# Patient Record
Sex: MALE | Race: WHITE | Employment: STUDENT | URBAN - METROPOLITAN AREA
[De-identification: names, ages, dates, MRNs, and addresses within clinical notes are randomized per-mention and may not be internally consistent; named-entity substitution may affect disease eponyms.]

---

## 2018-06-23 ENCOUNTER — TRANSFERRED RECORDS (OUTPATIENT)
Dept: HEALTH INFORMATION MANAGEMENT | Facility: CLINIC | Age: 23
End: 2018-06-23

## 2018-06-24 ENCOUNTER — TRANSFERRED RECORDS (OUTPATIENT)
Dept: HEALTH INFORMATION MANAGEMENT | Facility: CLINIC | Age: 23
End: 2018-06-24

## 2018-06-27 ENCOUNTER — TRANSFERRED RECORDS (OUTPATIENT)
Dept: HEALTH INFORMATION MANAGEMENT | Facility: CLINIC | Age: 23
End: 2018-06-27

## 2018-06-29 ENCOUNTER — TRANSFERRED RECORDS (OUTPATIENT)
Dept: HEALTH INFORMATION MANAGEMENT | Facility: CLINIC | Age: 23
End: 2018-06-29

## 2018-06-30 ENCOUNTER — TRANSFERRED RECORDS (OUTPATIENT)
Dept: HEALTH INFORMATION MANAGEMENT | Facility: CLINIC | Age: 23
End: 2018-06-30

## 2018-07-28 ENCOUNTER — TRANSFERRED RECORDS (OUTPATIENT)
Dept: HEALTH INFORMATION MANAGEMENT | Facility: CLINIC | Age: 23
End: 2018-07-28

## 2018-08-01 ENCOUNTER — TRANSFERRED RECORDS (OUTPATIENT)
Dept: HEALTH INFORMATION MANAGEMENT | Facility: CLINIC | Age: 23
End: 2018-08-01

## 2018-08-07 ENCOUNTER — MEDICAL CORRESPONDENCE (OUTPATIENT)
Dept: HEALTH INFORMATION MANAGEMENT | Facility: CLINIC | Age: 23
End: 2018-08-07

## 2018-08-07 ENCOUNTER — TRANSFERRED RECORDS (OUTPATIENT)
Dept: HEALTH INFORMATION MANAGEMENT | Facility: CLINIC | Age: 23
End: 2018-08-07

## 2018-08-08 ENCOUNTER — THERAPY VISIT (OUTPATIENT)
Dept: PHYSICAL THERAPY | Facility: CLINIC | Age: 23
End: 2018-08-08
Payer: COMMERCIAL

## 2018-08-08 DIAGNOSIS — Z98.1 S/P CERVICAL SPINAL FUSION: Primary | ICD-10-CM

## 2018-08-08 DIAGNOSIS — M62.81 MUSCLE WEAKNESS (GENERALIZED): ICD-10-CM

## 2018-08-08 DIAGNOSIS — M54.6 PAIN IN THORACIC SPINE: ICD-10-CM

## 2018-08-08 DIAGNOSIS — Z47.89 AFTERCARE FOLLOWING SURGERY OF THE MUSCULOSKELETAL SYSTEM: ICD-10-CM

## 2018-08-08 PROCEDURE — 97163 PT EVAL HIGH COMPLEX 45 MIN: CPT | Mod: GP | Performed by: PHYSICAL THERAPIST

## 2018-08-08 PROCEDURE — 97112 NEUROMUSCULAR REEDUCATION: CPT | Mod: GP | Performed by: PHYSICAL THERAPIST

## 2018-08-08 PROCEDURE — 97110 THERAPEUTIC EXERCISES: CPT | Mod: GP | Performed by: PHYSICAL THERAPIST

## 2018-08-08 NOTE — PROGRESS NOTES
[facility] Initial Evaluation  Subjective:  Patient is a 22 year old male presenting with rehab back hpi. The history is provided by the patient and a parent. No  was used.   Affected Side: Patient fractured his neck at C3,4 and C6,7 requiring fusion and discectomy. He also dislocated C7 and had nerve root compression of C7 with surgical intervention. he had plates and pins placed due to ulnar/radial fracture right wrist.  Occurance: Mountain biking in [place] and fell over the handle bars.  Condition occurred: during recreation/sport.  This is a new condition  DOI 6/23/18, DOS 6/24/18. Was in [facility] in [place] until yesterday he returned home to [place] and will be returning to college at [employer] in 2 weeks..    Patient reports pain:  Mid thoracic spine and upper thoracic spine.  Radiates to:  No radiation.  Pain is described as aching, sharp, stabbing and cramping and is constant (varying degrees. Greater when up ) and reported as 3/10.  Associated symptoms:  Tingling, numbness and loss of strength. Pain is worse during the day.  Symptoms are exacerbated by standing, lifting, walking and twisting and relieved by rest and analgesics.  Since onset symptoms are gradually improving.  Special tests:  MRI, x-ray and CT scan.  Previous treatment includes physical therapy and surgery.  There was significant improvement following previous treatment.  General health as reported by patient is good.  Pertinent medical history includes:  Numbness/tingling (weakness).  Medical allergies: no.  Surgical history: spinal fusion and wrist surgery.  Current medications:  Pain medication (tylenol).  Current occupation is student.  Patient is currently not working due to present treatment problem.  Primary job tasks include:  Prolonged sitting, prolonged standing, lifting, operating a machine and repetitive tasks (computer work, ).    Barriers include:  Transportation  and requires assistance with ADL's.    Red flags:  Changes in bowel/bladder.                        Objective:  Standing Alignment:    Cervical/Thoracic:  Forward head and flat thoracic upper spine  Shoulder/UE:  Rounded shoulders, elevated scapula L and elevated scapula R  Lumbar:  Lordosis decr  Pelvic:  PSIS high L and iliac crest high L  Hip:  Greater trochanter high L        Gait:    Assistive Devices:  None  Deviations:  Shoulder:  Decr arm swing R, decr arm swing L, shoulder hiking L and shoulder hiking RLumbar:  Trunk flexionGeneral Deviations:  Base of support decr, stride length decr and maryjane decr               Lumbar/SI Evaluation  ROM:  Arom wnl lumbar: Significant loss of movement thoracic spine.  AROM Lumbar:   Flexion:          Fingertip to knee  Ext:                    5 degrees   Side Bend:        Left:  Fingertip to mid thigh with minimal movement lumbar spine    Right:  Finger tip to mid thigh minimal movement lumbar spine  Rotation:           Left:     Right:   Side Glide:        Left:     Right:       AROM Thoracic:  Flex:              Ext:                 Rotation:        Left:  5 degrees    Right:  10 degrees     Strength: Extremely weak gluteal complex bilaterally. Patient unable to control pelvis in supine hook lying when lifting one foot off plinth, pelvis rotates and lumbar spine flexes and extends. In standing, stands with pelvis anterior to shoulders.  Lumbar Myotomes:          L5 (Great Toe Ext): Left: 3    Right: 3           Neural Tension/Mobility:      Left side:SLR or Slump  negative.     Right side:   Slump or SLR  negative.   Lumbar Palpation:    Tenderness present at Left:    Erector Spinae and Piriformis  Tenderness present at Right: Erector Spinae and Piriformis        SI joint/Sacrum:            Sacral conclusion left:  Posterior inominate and upslip       Cervical/Thoracic Evaluation  Arom wnl cervical: Patient in cervical collar for another 6 weeks. No movement of neck.  25# weight lifting limit with arms.           Cervical Myotomes:  Cervical myotomes: C1-5 not assessed bilaterally. T1 fair bilaterally. C8 good bilaterally.                  DTR's:  not assessed        Neural Tension:      Right side:  Ulnar and Median positive.    Cervical Palpation:    Tenderness present at Left:    Rhomboids and Upper Trap  Tenderness present at Right:    Rhomboids and Upper Trap        Cord Sign:  not assessed                                            General     ROS    Assessment/Plan:    Patient is a 22 year old male with cervical, thoracic, lumbar and pelvic complaints.    Patient has the following significant findings with corresponding treatment plan.                Diagnosis 1:  S/p cervical spine fusion  Pain -  hot/cold therapy, education and home program  Diagnosis 2:  Thoracic pain   Pain -  hot/cold therapy, manual therapy, self management, education and home program  Decreased ROM/flexibility - manual therapy, therapeutic exercise, therapeutic activity and home program  Decreased strength - therapeutic exercise, therapeutic activities and home program  Impaired posture - neuro re-education, therapeutic activities and home program  Diagnosis 3:  Generalized muscle weakness  Decreased strength - therapeutic exercise, therapeutic activities and home program  Impaired balance - neuro re-education, therapeutic activities and home program  Decreased proprioception - neuro re-education, therapeutic activities and home program  Impaired gait - gait training and home program  Decreased function - therapeutic activities and home program  Impaired posture - neuro re-education, therapeutic activities and home program     Therapy Evaluation Codes:   1) History comprised of:   Personal factors that impact the plan of care:      Living environment.    Comorbidity factors that impact the plan of care are:      Numbness/tingling and Weakness.     Medications impacting care: Pain.  2) Examination of  Body Systems comprised of:   Body structures and functions that impact the plan of care:      Cervical spine, Lumbar spine, Pelvis and Sacral illiac joint.   Activity limitations that impact the plan of care are:      Bathing, Bending, Driving, Dressing, Jumping, Lifting, Running, Sitting, Sports, Squatting/kneeling, Stairs, Standing, Throwing, Walking, Working and Sleeping.  3) Clinical presentation characteristics are:   Evolving/Changing.  4) Decision-Making    High complexity using standardized patient assessment instrument and/or measureable assessment of functional outcome.  Cumulative Therapy Evaluation is: High complexity.    Previous and current functional limitations:  (See Goal Flow Sheet for this information)    Short term and Long term goals: (See Goal Flow Sheet for this information)     Communication ability:  Patient appears to be able to clearly communicate and understand verbal and written communication and follow directions correctly.  Treatment Explanation - The following has been discussed with the patient:   RX ordered/plan of care  Anticipated outcomes  Possible risks and side effects  This patient would benefit from PT intervention to resume normal activities.   Rehab potential is good.    Frequency:  2 X week, once daily  Duration:  for 12 weeks  Discharge Plan:  Achieve all LTG.  Independent in home treatment program.  Reach maximal therapeutic benefit.    Please refer to the daily flowsheet for treatment today, total treatment time and time spent performing 1:1 timed codes.

## 2018-08-08 NOTE — LETTER
[facility]  [address]  [place] [address]  [phone]    2018    Re: Willi Mendosa   :   1995  MRN:  [id number]   REFERRING PHYSICIAN:   No ref. provider found    [facility] PHYSICAL [facility]    Date of Initial Evaluation: 2018  Visits:  Rxs Used: 1  Reason for Referral:     S/P cervical spinal fusion  Aftercare following surgery of the musculoskeletal system  Pain in thoracic spine  Muscle weakness (generalized)    EVALUATION SUMMARY    [facility] Initial Evaluation  Subjective:  Patient is a 22 year old male presenting with rehab back hpi. The history is provided by the patient and a parent. No  was used.   Affected Side: Patient fractured his neck at C3,4 and C6,7 requiring fusion and discectomy. He also dislocated C7 and had nerve root compression of C7 with surgical intervention. he had plates and pins placed due to ulnar/radial fracture right wrist.  Occurance: Mountain biking in [place] and fell over the handle bars.  Condition occurred: during recreation/sport.  This is a new condition  DOI 18, DOS 18. Was in [facility] in [place] until yesterday he returned home to [place] and will be returning to college at [employer] in 2 weeks..    Patient reports pain:  Mid thoracic spine and upper thoracic spine.  Radiates to:  No radiation.  Pain is described as aching, sharp, stabbing and cramping and is constant (varying degrees. Greater when up ) and reported as 3/10.  Associated symptoms:  Tingling, numbness and loss of strength. Pain is worse during the day.  Symptoms are exacerbated by standing, lifting, walking and twisting and relieved by rest and analgesics.  Since onset symptoms are gradually improving.  Special tests:  MRI, x-ray and CT scan.  Previous treatment includes physical therapy and surgery.  There was  significant improvement following previous treatment.  General health as reported by patient is good.  Pertinent medical history includes:  Numbness/tingling (weakness).  Medical allergies: no.  Surgical history: spinal fusion and wrist surgery.  Current medications:  Pain medication (tylenol).  Current occupation is student.  Patient is currently not working due to present treatment problem.  Primary job tasks include:  Prolonged sitting, prolonged standing, lifting, operating a machine and repetitive tasks (computer work, ).  Barriers include:  Transportation and requires assistance with ADL's.  Red flags:  Changes in bowel/bladder.    Objective:  Standing Alignment:    Cervical/Thoracic:  Forward head and flat thoracic upper spine  Shoulder/UE:  Rounded shoulders, elevated scapula L and elevated scapula R  Lumbar:  Lordosis decr  Pelvic:  PSIS high L and iliac crest high L  Hip:  Greater trochanter high L    Gait:    Assistive Devices:  None  Deviations:  Shoulder:  Decr arm swing R, decr arm swing L, shoulder hiking L and shoulder hiking RLumbar:  Trunk flexionGeneral Deviations:  Base of support decr, stride length decr and maryjane decr          Lumbar/SI Evaluation  ROM:  Arom wnl lumbar: Significant loss of movement thoracic spine.  AROM Lumbar:   Flexion:          Fingertip to knee  Ext:                    5 degrees   Side Bend:        Left:  Fingertip to mid thigh with minimal movement lumbar spine    Right:  Finger tip to mid thigh minimal movement lumbar spine  Rotation:           Left:     Right:   Side Glide:        Left:     Right:       AROM Thoracic:  Flex:              Ext:                 Rotation:        Left:  5 degrees    Right:  10 degrees   Strength: Extremely weak gluteal complex bilaterally. Patient unable to control pelvis in supine hook lying when lifting one foot off plinth, pelvis rotates and lumbar spine flexes and extends. In standing, stands with pelvis anterior to  shoulders.    Lumbar Myotomes:    L5 (Great Toe Ext): Left: 3    Right: 3     Neural Tension/Mobility:    Left side:SLR or Slump  negative.   Right side:   Slump or SLR  negative.     Lumbar Palpation:    Tenderness present at Left:    Erector Spinae and Piriformis  Tenderness present at Right: Erector Spinae and Piriformis    SI joint/Sacrum:      Sacral conclusion left:  Posterior inominate and upslip       Cervical/Thoracic Evaluation  Arom wnl cervical: Patient in cervical collar for another 6 weeks. No movement of neck. 25# weight lifting limit with arms.       Cervical Myotomes:  Cervical myotomes: C1-5 not assessed bilaterally. T1 fair bilaterally. C8 good bilaterally.    DTR's:  not assessed    Neural Tension:    Right side:  Ulnar and Median positive.    Cervical Palpation:    Tenderness present at Left:    Rhomboids and Upper Trap  Tenderness present at Right:    Rhomboids and Upper Trap    Cord Sign:  not assessed    Assessment/Plan:    Patient is a 22 year old male with cervical, thoracic, lumbar and pelvic complaints.    Patient has the following significant findings with corresponding treatment plan.                Diagnosis 1:  S/p cervical spine fusion  Pain -  hot/cold therapy, education and home program  Diagnosis 2:  Thoracic pain   Pain -  hot/cold therapy, manual therapy, self management, education and home program  Decreased ROM/flexibility - manual therapy, therapeutic exercise, therapeutic activity and home program  Decreased strength - therapeutic exercise, therapeutic activities and home program  Impaired posture - neuro re-education, therapeutic activities and home program  Diagnosis 3:  Generalized muscle weakness  Decreased strength - therapeutic exercise, therapeutic activities and home program  Impaired balance - neuro re-education, therapeutic activities and home program  Decreased proprioception - neuro re-education, therapeutic activities and home program  Impaired gait - gait  training and home program  Decreased function - therapeutic activities and home program  Impaired posture - neuro re-education, therapeutic activities and home program     Therapy Evaluation Codes:   1) History comprised of:   Personal factors that impact the plan of care:      Living environment.    Comorbidity factors that impact the plan of care are:      Numbness/tingling and Weakness.     Medications impacting care: Pain.  2) Examination of Body Systems comprised of:   Body structures and functions that impact the plan of care:      Cervical spine, Lumbar spine, Pelvis and Sacral illiac joint.   Activity limitations that impact the plan of care are:      Bathing, Bending, Driving, Dressing, Jumping, Lifting, Running, Sitting, Sports, Squatting/kneeling, Stairs, Standing, Throwing, Walking, Working and Sleeping.  3) Clinical presentation characteristics are:   Evolving/Changing.  4) Decision-Making    High complexity using standardized patient assessment instrument and/or measureable assessment of functional outcome.  Cumulative Therapy Evaluation is: High complexity.    Previous and current functional limitations:  (See Goal Flow Sheet for this information)    Short term and Long term goals: (See Goal Flow Sheet for this information)     Communication ability:  Patient appears to be able to clearly communicate and understand verbal and written communication and follow directions correctly.  Treatment Explanation - The following has been discussed with the patient:   RX ordered/plan of care  Anticipated outcomes  Possible risks and side effects  This patient would benefit from PT intervention to resume normal activities.   Rehab potential is good.    Frequency:  2 X week, once daily  Duration:  for 12 weeks  Discharge Plan:  Achieve all LTG.  Independent in home treatment program.  Reach maximal therapeutic benefit.    Please refer to the daily flowsheet for treatment today, total treatment time and time spent  performing 1:1 timed codes.     Thank you for your referral.    INQUIRIES  Therapist: Yasmin Rodriguez PT, [facility]  [facility] [place] [facility]  [address] [place] [address] [id number]  [facility] [address]  Phone: [phone]  Fax: [phone]

## 2018-08-13 ENCOUNTER — THERAPY VISIT (OUTPATIENT)
Dept: PHYSICAL THERAPY | Facility: CLINIC | Age: 23
End: 2018-08-13
Payer: COMMERCIAL

## 2018-08-13 DIAGNOSIS — Z98.1 S/P CERVICAL SPINAL FUSION: ICD-10-CM

## 2018-08-13 DIAGNOSIS — M62.81 MUSCLE WEAKNESS (GENERALIZED): ICD-10-CM

## 2018-08-13 DIAGNOSIS — Z47.89 AFTERCARE FOLLOWING SURGERY OF THE MUSCULOSKELETAL SYSTEM: ICD-10-CM

## 2018-08-13 DIAGNOSIS — M54.6 PAIN IN THORACIC SPINE: ICD-10-CM

## 2018-08-13 PROCEDURE — 97110 THERAPEUTIC EXERCISES: CPT | Mod: GP | Performed by: PHYSICAL THERAPIST

## 2018-08-13 PROCEDURE — 97112 NEUROMUSCULAR REEDUCATION: CPT | Mod: GP | Performed by: PHYSICAL THERAPIST

## 2018-08-15 ENCOUNTER — THERAPY VISIT (OUTPATIENT)
Dept: PHYSICAL THERAPY | Facility: CLINIC | Age: 23
End: 2018-08-15
Payer: COMMERCIAL

## 2018-08-15 DIAGNOSIS — M62.81 MUSCLE WEAKNESS (GENERALIZED): ICD-10-CM

## 2018-08-15 DIAGNOSIS — Z98.1 S/P CERVICAL SPINAL FUSION: ICD-10-CM

## 2018-08-15 DIAGNOSIS — M54.6 PAIN IN THORACIC SPINE: ICD-10-CM

## 2018-08-15 DIAGNOSIS — Z47.89 AFTERCARE FOLLOWING SURGERY OF THE MUSCULOSKELETAL SYSTEM: ICD-10-CM

## 2018-08-15 PROCEDURE — 97110 THERAPEUTIC EXERCISES: CPT | Mod: GP | Performed by: PHYSICAL THERAPIST

## 2018-08-15 PROCEDURE — 97140 MANUAL THERAPY 1/> REGIONS: CPT | Mod: GP | Performed by: PHYSICAL THERAPIST

## 2018-08-17 ENCOUNTER — THERAPY VISIT (OUTPATIENT)
Dept: PHYSICAL THERAPY | Facility: CLINIC | Age: 23
End: 2018-08-17
Payer: COMMERCIAL

## 2018-08-17 DIAGNOSIS — Z98.1 S/P CERVICAL SPINAL FUSION: ICD-10-CM

## 2018-08-17 DIAGNOSIS — M62.81 MUSCLE WEAKNESS (GENERALIZED): ICD-10-CM

## 2018-08-17 DIAGNOSIS — M54.6 PAIN IN THORACIC SPINE: ICD-10-CM

## 2018-08-17 DIAGNOSIS — Z47.89 AFTERCARE FOLLOWING SURGERY OF THE MUSCULOSKELETAL SYSTEM: ICD-10-CM

## 2018-08-17 PROCEDURE — 97110 THERAPEUTIC EXERCISES: CPT | Mod: GP | Performed by: PHYSICAL THERAPIST

## 2018-08-17 PROCEDURE — 97112 NEUROMUSCULAR REEDUCATION: CPT | Mod: GP | Performed by: PHYSICAL THERAPIST

## 2018-08-17 NOTE — PROGRESS NOTES
Subjective:  HPI                    Objective:  System    Physical Exam    General     ROS    Assessment/Plan:    DISCHARGE REPORT    Progress reporting period is from 8/8/18 to 8/17/18 (4 visits).       SUBJECTIVE  Subjective changes noted by patient: .  Patient notes he is doing well with his HEP.  He has had a few instances of pain in the L arm and tricep area, but relates to prolonged sitting with slouched posture (discussed if feels that to correct posture first and bring left arm toward chest).  Notes he will move to college this weekend at [place].  He has PT set up there.     Current pain level is 2/10  .     nitial Pain level: 3/10.   Changes in function:  Yes (See Goal flowsheet attached for changes in current functional level)  Adverse reaction to treatment or activity: None    OBJECTIVE  Changes noted in objective findings:  Yes,   Objective: 4/5 bicep/tricep strength, 4/5 hip abduction and extension strength.  SLS level eyes open x 30 sec on L and 20 on R.  Gait steadiness is improving, intermittent mild sway, but no LOB with ambulation.  Overall improving.  Has HEP in place and undestands plan.      Ambulating: Alter G x 40 min 90% BW no incline small shorts brisk walking speed in PT.  Likely ready to work on a standard treadmill or walking on level ground going forward as balance and gait have improved.     ASSESSMENT/PLAN  Updated problem list and treatment plan: Diagnosis 1:  UE and LE weakness following spinal cord injury and cervical fusion on 6/24/18    Pain -  hot/cold therapy  Decreased ROM/flexibility - manual therapy and therapeutic exercise  Decreased strength - therapeutic exercise and therapeutic activities  Decreased proprioception - neuro re-education and therapeutic activities  Impaired gait - gait training  Decreased function - therapeutic activities  Impaired posture - neuro re-education  STG/LTGs have been met or progress has been made towards goals:  Yes (See Goal flow sheet  completed today.)  Assessment of Progress: The patient's condition is improving.  Self Management Plans:  Patient has been instructed in a home treatment program.  I have re-evaluated this patient and find that the nature, scope, duration and intensity of the therapy is appropriate for the medical condition of the patient.  Willi continues to require the following intervention to meet STG and LTG's:  PT    Recommendations:  Patient would benefit from continued PT to address deficits.  He is; however, moving this weekend for college at [place].  He will resume PT there after he arrives.  Will discharge from PT at this facility, and he will continue as planned near [place].    Please refer to the daily flowsheet for treatment today, total treatment time and time spent performing 1:1 timed codes.

## 2018-08-17 NOTE — LETTER
[place] [facility]AP[facility]  [address]  [place] [address]  [phone]    2018    Re: Willi Mendosa   :   1995  MRN:  [id number]   REFERRING PHYSICIAN:   Provider Not In System    [facility] ATHLETIC [facility]  Date of Initial Evaluation: 2018  Visits:  Rxs Used: 4  Reason for Referral:     S/P cervical spinal fusion  Aftercare following surgery of the musculoskeletal system  Pain in thoracic spine  Muscle weakness (generalized)    EVALUATION SUMMARY  Assessment/Plan:    DISCHARGE REPORT    Progress reporting period is from 18 to 18 (4 visits).       SUBJECTIVE  Subjective changes noted by patient: .  Patient notes he is doing well with his HEP.  He has had a few instances of pain in the L arm and tricep area, but relates to prolonged sitting with slouched posture (discussed if feels that to correct posture first and bring left arm toward chest).  Notes he will move to college this weekend at [place].  He has PT set up there.     Current pain level is 2/10  .     nitial Pain level: 3/10.   Changes in function:  Yes (See Goal flowsheet attached for changes in current functional level)  Adverse reaction to treatment or activity: None    OBJECTIVE  Changes noted in objective findings:  Yes,   Objective: 4/5 bicep/tricep strength, 4/5 hip abduction and extension strength.  SLS level eyes open x 30 sec on L and 20 on R.  Gait steadiness is improving, intermittent mild sway, but no LOB with ambulation.  Overall improving.  Has HEP in place and undestands plan.    Ambulating: Alter G x 40 min 90% BW no incline small shorts brisk walking speed in PT.  Likely ready to work on a standard treadmill or walking on level ground going forward as balance and gait have improved.     ASSESSMENT/PLAN  Updated problem list and treatment plan: Diagnosis 1:  UE and LE weakness following spinal cord injury and cervical  fusion on 6/24/18    Pain -  hot/cold therapy  Decreased ROM/flexibility - manual therapy and therapeutic exercise  Decreased strength - therapeutic exercise and therapeutic activities  Decreased proprioception - neuro re-education and therapeutic activities  Impaired gait - gait training  Decreased function - therapeutic activities  Impaired posture - neuro re-education  STG/LTGs have been met or progress has been made towards goals:  Yes (See Goal flow sheet completed today.)  Assessment of Progress: The patient's condition is improving.  Self Management Plans:  Patient has been instructed in a home treatment program.  I have re-evaluated this patient and find that the nature, scope, duration and intensity of the therapy is appropriate for the medical condition of the patient.  Willi continues to require the following intervention to meet STG and LTG's:  PT    Recommendations:  Patient would benefit from continued PT to address deficits.  He is; however, moving this weekend for college at [place].  He will resume PT there after he arrives.  Will discharge from PT at this facility, and he will continue as planned near [place].    Please refer to the daily flowsheet for treatment today, total treatment time and time spent performing 1:1 timed codes.    Thank you for your referral.    INQUIRIES  Therapist: Jose Guevara, RHYSPT, Cert. SCS, MDT  [facility] [place] [facility]  [address] [place] [address] [address] [place] [address]  Phone: [phone]  Fax: [phone]

## 2018-09-07 ENCOUNTER — MEDICAL CORRESPONDENCE (OUTPATIENT)
Dept: HEALTH INFORMATION MANAGEMENT | Facility: CLINIC | Age: 23
End: 2018-09-07

## 2018-09-25 ENCOUNTER — TRANSFERRED RECORDS (OUTPATIENT)
Dept: HEALTH INFORMATION MANAGEMENT | Facility: CLINIC | Age: 23
End: 2018-09-25

## 2018-10-03 ENCOUNTER — TELEPHONE (OUTPATIENT)
Dept: NEUROLOGY | Facility: CLINIC | Age: 23
End: 2018-10-03

## 2018-10-04 ENCOUNTER — TRANSFERRED RECORDS (OUTPATIENT)
Dept: HEALTH INFORMATION MANAGEMENT | Facility: CLINIC | Age: 23
End: 2018-10-04

## 2018-10-11 ENCOUNTER — TRANSFERRED RECORDS (OUTPATIENT)
Dept: HEALTH INFORMATION MANAGEMENT | Facility: CLINIC | Age: 23
End: 2018-10-11

## 2018-10-13 ENCOUNTER — HEALTH MAINTENANCE LETTER (OUTPATIENT)
Age: 23
End: 2018-10-13

## 2018-10-17 ASSESSMENT — ENCOUNTER SYMPTOMS
ALTERED TEMPERATURE REGULATION: 0
STIFFNESS: 1
HALLUCINATIONS: 0
SINUS PAIN: 0
SMELL DISTURBANCE: 0
NECK MASS: 0
NAIL CHANGES: 0
SPEECH CHANGE: 0
LOSS OF CONSCIOUSNESS: 0
WEAKNESS: 1
NIGHT SWEATS: 0
DISTURBANCES IN COORDINATION: 0
HEADACHES: 0
MUSCLE WEAKNESS: 1
MEMORY LOSS: 0
TROUBLE SWALLOWING: 0
CHILLS: 0
DECREASED APPETITE: 0
TASTE DISTURBANCE: 0
POLYDIPSIA: 0
POLYPHAGIA: 0
PARALYSIS: 0
DIZZINESS: 1
POOR WOUND HEALING: 1
HOARSE VOICE: 0
NUMBNESS: 1
SINUS CONGESTION: 1
SORE THROAT: 1
WEIGHT LOSS: 0
MYALGIAS: 1
TINGLING: 1
TREMORS: 0
ARTHRALGIAS: 1
FATIGUE: 0
NECK PAIN: 1
INCREASED ENERGY: 0
BACK PAIN: 1
SKIN CHANGES: 0
FEVER: 0
SEIZURES: 0
WEIGHT GAIN: 0
JOINT SWELLING: 0
MUSCLE CRAMPS: 0

## 2018-10-18 ENCOUNTER — RADIANT APPOINTMENT (OUTPATIENT)
Dept: GENERAL RADIOLOGY | Facility: CLINIC | Age: 23
End: 2018-10-18
Attending: PHYSICIAN ASSISTANT
Payer: COMMERCIAL

## 2018-10-18 ENCOUNTER — OFFICE VISIT (OUTPATIENT)
Dept: NEUROSURGERY | Facility: CLINIC | Age: 23
End: 2018-10-18
Payer: COMMERCIAL

## 2018-10-18 VITALS
HEIGHT: 77 IN | RESPIRATION RATE: 18 BRPM | DIASTOLIC BLOOD PRESSURE: 77 MMHG | BODY MASS INDEX: 19.42 KG/M2 | TEMPERATURE: 98.2 F | HEART RATE: 77 BPM | WEIGHT: 164.5 LBS | SYSTOLIC BLOOD PRESSURE: 121 MMHG | OXYGEN SATURATION: 100 %

## 2018-10-18 DIAGNOSIS — T81.89XA NON-HEALING SURGICAL WOUND, INITIAL ENCOUNTER: ICD-10-CM

## 2018-10-18 DIAGNOSIS — Z87.81 H/O CERVICAL FRACTURE: Primary | ICD-10-CM

## 2018-10-18 ASSESSMENT — PAIN SCALES - GENERAL: PAINLEVEL: MILD PAIN (2)

## 2018-10-18 NOTE — MR AVS SNAPSHOT
After Visit Summary   10/18/2018    Willi Mendosa    MRN: [id number]           Patient Information     Date Of Birth          1995        Visit Information        Provider Department      10/18/2018 11:00 AM Shelley Burnett PA-C [facility]surgery        Today's Diagnoses     H/O cervical fracture    -  1       Follow-ups after your visit        Your next 10 appointments already scheduled     Oct 18, 2018 12:05 PM CDT   XR CERVICAL SPINE G/E 4 VIEWS with UCXR1   [facility] ([facility])    [address] [address] [phone]           How do I prepare for my exam? (Food and drink instructions) No Food and Drink Restrictions.  How do I prepare for my exam? (Other instructions) You do not need to do anything special for this exam.  What should I wear: Wear comfortable clothes.  How long does the exam take: Most scans take less than 5 minutes.  What should I bring: Bring a list of your medicines, including vitamins, minerals and over-the-counter drugs. It is safest to leave personal items at home.  Do I need a :  No  is needed.  What do I need to tell my doctor: Tell your doctor if there s any chance you are pregnant.  What should I do after the exam: No restrictions, You may resume normal activities.  What is this test: An image of a specific body part shown in shades of black and white.  Who should I call with questions: If you have any questions, please call the Imaging Department where you will have your exam. Directions, parking instructions, and other information is available on our website, [email or web address].            Dec 17, 2018 10:00 AM CST   (Arrive by 9:45 AM)   Return Visit with CHRISTOFER France [facility] ([facility])    [address] [address] [phone]              Who to contact     Please call  "your clinic at [phone] to:    Ask questions about your health    Make or cancel appointments    Discuss your medicines    Learn about your test results    Speak to your doctor            Additional Information About Your Visit        [employer] Information     [employer] gives you secure access to your electronic health record. If you see a primary care provider, you can also send messages to your care team and make appointments. If you have questions, please call your primary care clinic.  If you do not have a primary care provider, please call [phone] and they will assist you.      [employer] is an electronic gateway that provides easy, online access to your medical records. With [employer], you can request a clinic appointment, read your test results, renew a prescription or communicate with your care team.     To access your existing account, please contact your [facility] or call [phone] for assistance.        Care EveryWhere ID     This is your Care EveryWhere ID. This could be used by other organizations to access your [facility] medical records  [id number]        Your Vitals Were     Pulse Temperature Respirations Height Pulse Oximetry BMI (Body Mass Index)    77 98.2  F (36.8  C) (Oral) 18 1.956 m (6' 5\") 100% 19.51 kg/m2       Blood Pressure from Last 3 Encounters:   10/18/18 121/77    Weight from Last 3 Encounters:   10/18/18 74.6 kg (164 lb 8 oz)              We Performed the Following     X-ray Cervical spine 4 views (AP, lateral, flexion, extension)  [[id number]]        Primary Care Provider Fax #    Physician No Ref-Primary [phone]       No address on file        Equal Access to Services     ELEAZAR ROBLES : Hadii shine wileyo Sokingsley, waaxda luqadaha, qaybta kaalmada adeegyada, sharan keys . So [facility] [phone].    ATENCIÓN: Si habla español, tiene a alfred disposición servicios gratuitos de asistencia lingüística. Llame al " [phone].    We comply with applicable federal civil rights laws and [place] laws. We do not discriminate on the basis of race, color, national origin, age, disability, sex, sexual orientation, or gender identity.            Thank you!     Thank you for choosing [facility]UROS[facility]  for your care. Our goal is always to provide you with excellent care. Hearing back from our patients is one way we can continue to improve our services. Please take a few minutes to complete the written survey that you may receive in the mail after your visit with us. Thank you!             Your Updated Medication List - Protect others around you: Learn how to safely use, store and throw away your medicines at www.disposemymeds.org.          This list is accurate as of 10/18/18 11:42 AM.  Always use your most recent med list.                   Brand Name Dispense Instructions for use Diagnosis    ALLEGRA PO      Take by mouth as needed for allergies        TYLENOL PO      Take by mouth as needed for mild pain or fever

## 2018-10-18 NOTE — NURSING NOTE
Chief Complaint   Patient presents with     Consult     ump new patient consultation visit for c3-4 and c6-7 three column injuries       Neville Garcia MA
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
## 2018-10-18 NOTE — LETTER
Date:October 19, 2018      Patient was self referred, no letter generated. Do not send.        [facility] Health Information

## 2018-11-19 ENCOUNTER — OFFICE VISIT (OUTPATIENT)
Dept: FAMILY MEDICINE | Facility: CLINIC | Age: 23
End: 2018-11-19
Payer: COMMERCIAL

## 2018-11-19 VITALS — TEMPERATURE: 97.9 F | DIASTOLIC BLOOD PRESSURE: 74 MMHG | SYSTOLIC BLOOD PRESSURE: 121 MMHG

## 2018-11-19 DIAGNOSIS — Z71.84 TRAVEL ADVICE ENCOUNTER: Primary | ICD-10-CM

## 2018-11-19 DIAGNOSIS — Z23 NEED FOR VACCINATION: ICD-10-CM

## 2018-11-19 PROCEDURE — 90715 TDAP VACCINE 7 YRS/> IM: CPT | Mod: GA | Performed by: NURSE PRACTITIONER

## 2018-11-19 PROCEDURE — 90472 IMMUNIZATION ADMIN EACH ADD: CPT | Mod: GA | Performed by: NURSE PRACTITIONER

## 2018-11-19 PROCEDURE — 90691 TYPHOID VACCINE IM: CPT | Mod: GA | Performed by: NURSE PRACTITIONER

## 2018-11-19 PROCEDURE — 90471 IMMUNIZATION ADMIN: CPT | Mod: GA | Performed by: NURSE PRACTITIONER

## 2018-11-19 PROCEDURE — 99402 PREV MED CNSL INDIV APPRX 30: CPT | Mod: 25 | Performed by: NURSE PRACTITIONER

## 2018-11-19 PROCEDURE — 90738 INACTIVATED JE VACC IM: CPT | Mod: GA | Performed by: NURSE PRACTITIONER

## 2018-11-19 RX ORDER — ATOVAQUONE AND PROGUANIL HYDROCHLORIDE 250; 100 MG/1; MG/1
1 TABLET, FILM COATED ORAL DAILY
Qty: 30 TABLET | Refills: 0 | Status: SHIPPED | OUTPATIENT
Start: 2018-11-19

## 2018-11-19 RX ORDER — AZITHROMYCIN 500 MG/1
500 TABLET, FILM COATED ORAL DAILY
Qty: 3 TABLET | Refills: 0 | Status: SHIPPED | OUTPATIENT
Start: 2018-11-19 | End: 2018-11-22

## 2018-11-19 NOTE — PROGRESS NOTES
Nurse Note      It[place]ary:  [place]      Departure Date: 1/14/19      Return Date: 5/31/19      Length of Trip 5 months      Reason for Travel: Study abroad           Urban or rural: both      Accommodations: Hotel    Hostel    Family home    Private dwelling        IMMUNIZATION HISTORY  Have you received any immunizations within the past 4 weeks?  Yes  Have you ever fainted from having your blood drawn or from an injection?  No  Have you ever had a fever reaction to vaccination?  No  Have you ever had any bad reaction or side effect from any vaccination?  No  Have you ever had hepatitis A or B vaccine?  Yes  Do you live (or work closely) with anyone who has AIDS, an AIDS-like condition, any other immune disorder or who is on chemotherapy for cancer?  No  Do you have a family history of immunodeficiency?  No  Have you received any injection of immune globulin or any blood products during the past 12 months?  No    Patient roomed by Gilberto Carroll  Willi Mendosa is a 23 year old male seen today alone for counsultation for international travel to [place]  for Study abroad with additional [place].  Patient will be departing in  7 week(s) and staying for   4.5 month(s) and  traveling school and will meet up with family members.      Patient itinerary :  will be in the urban and rural region  Patient's activities will include sightseeing and study abroad.    Patient's country of birth is [place]    Special medical concerns: recent spinal cord injury  Pre-travel questionnaire was completed by patient and reviewed by provider.     Vitals: /74 (BP Location: Left arm)  Temp 97.9  F (36.6  C) (Oral)  BMI= There is no height or weight on file to calculate BMI.    EXAM:  General:  Well-nourished, well-developed in no acute distress.  Appears to be stated age, interacts appropriately  and expresses understanding of information given to patient.    Current Outpatient Prescriptions   Medication Sig Dispense Refill     Acetaminophen (TYLENOL PO) Take by mouth as needed for mild pain or fever       Fexofenadine HCl (ALLEGRA PO) Take by mouth as needed for allergies       Patient Active Problem List   Diagnosis     Non-healing surgical wound     No Known Allergies      Immunizations discussed include:   Hepatitis A:  Up to date  Hepatitis B: Up to date  Influenza: Up to date  Typhoid: Ordered/given today, risks, benefits and side effects reviewed  Rabies: Ordered/given today, risks, benefits and side effects reviewed  Yellow Fever: Not indicated  Sinhala Encephalitis: Ordered/given today - side effects, precautions, allergies, risks discussed. Patient expressed understanding.  Meningococcus: Up to date  Tetanus/Diphtheria: Ordered/given today, risks, benefits and side effects reviewed  Measles/Mumps/Rubella: Up to date  Cholera: Not needed  Polio: Up to date  Pneumococcal: Under age of 65  Varicella: Up to date  Zostavax:  Not indicated  Shingrix: Not indicated  HPV:  Up to date  TB:  Low risk     Altitude Exposure on this trip: no  Past tolerance to Altitude: na    ASSESSMENT/PLAN:    ICD-10-CM    1. Travel advice encounter Z71.89 TYPHOID VACCINE, IM     TDAP, IM (10 - 64 YRS) - Adacel     C IMOVAX RABIES VACCINE, IM     Bahamian ENCEPHALITIS IM 16 YO +     Bahamian ENCEPHALITIS IM 16 YO +     atovaquone-proguanil (MALARONE) 250-100 MG per tablet     azithromycin (ZITHROMAX) 500 MG tablet     C IMOVAX RABIES VACCINE, IM     ADMIN 1st VACCINE     EA ADD'L VACCINE   2. Need for vaccination Z23 TYPHOID VACCINE, IM     TDAP, IM (10 - 64 YRS) - Adacel     C IMOVAX RABIES VACCINE, IM     Bahamian ENCEPHALITIS IM 16 YO +     Bahamian ENCEPHALITIS IM 16 YO +     C IMOVAX RABIES VACCINE, IM     ADMIN 1st VACCINE     EA ADD'L VACCINE     I have reviewed general recommendations for safe travel   including:  food/water precautions, insect precautions, safer sex   practices given high prevalence of Zika, HIV and other STDs,   roadway safety. Educational materials and [employer]ax report provided.    Malaraia prophylaxis recommended: Malarone  Symptomatic treatment for traveler's diarrhea: azithromycin  Altitude illness prevention and treatment: none      Evacuation insurance advised and resources were provided to patient.    Total visit time 30 minutes  with over 50% of time spent counseling patient as detailed above.    Katarina Lizarraga CNP

## 2018-11-19 NOTE — MR AVS SNAPSHOT
After Visit Summary   11/19/2018    Willi Mendosa    MRN: [id number]           Patient Information     Date Of Birth          1995        Visit Information        Provider Department      11/19/2018 12:30 PM Katarina Lizarraga APRN CNP [facility]        Today's Diagnoses     Travel advice encounter    -  1    Need for vaccination          Care Instructions    Today November 19, 2018 you received the    Rabies Vaccine - Please return on 11/26/18 for your 2nd dose and 12/17/2018 for your 3rd and final dose.    Japanese Encephalitis (JE) Vaccine - Please return on 12/17/2018 for your 2nd and final dose.    Tetanus (Tdap) Vaccine    Typhoid - injectable. This vaccine is valid for two years.   .    These appointments can be made as a NURSE ONLY visit.    **It is very important for the vaccinations to be given on the scheduled day(s), this helps ensure you receive the full effectiveness of the vaccine.**    Please call [facility] with any questions [phone]    Thank you for visiting [facility]              Follow-ups after your visit        Your next 10 appointments already scheduled     Dec 17, 2018 10:00 AM CST   (Arrive by 9:45 AM)   Return Visit with Shelley Burnett PA-C   [facility] ([facility])    [address]  3rd Floor  [facility] [address] [phone]            Dec 18, 2018 10:45 AM CST   Nurse Only with UP NURSE   [facility] ([facility])    [address]  [facility] [address] [phone]              Who to contact     If you have questions or need follow up information about today's clinic visit or your schedule please contact [facility] directly at [phone].  Normal or non-critical lab and imaging results will be communicated to you by MyChart, letter or phone within 4 business days after the clinic has received the  results. If you do not hear from us within 7 days, please contact the clinic through [employer] or phone. If you have a critical or abnormal lab result, we will notify you by phone as soon as possible.  Submit refill requests through [employer] or call your pharmacy and they will forward the refill request to us. Please allow 3 business days for your refill to be completed.          Additional Information About Your Visit        [employer] Ad[employer] Information     [employer] gives you secure access to your electronic health record. If you see a primary care provider, you can also send messages to your care team and make appointments. If you have questions, please call your primary care clinic.  If you do not have a primary care provider, please call [phone] and they will assist you.        Care EveryWhere ID     This is your Care EveryWhere ID. This could be used by other organizations to access your [facility] medical records  [id number]        Your Vitals Were     Temperature                   97.9  F (36.6  C) (Oral)            Blood Pressure from Last 3 Encounters:   11/19/18 121/74   10/18/18 121/77    Weight from Last 3 Encounters:   10/18/18 164 lb 8 oz (74.6 kg)              We Performed the Following     ADMIN 1st VACCINE     EA ADD'L VACCINE     TDAP, IM (10 - 64 YRS) - Adacel     TYPHOID VACCINE, IM          Today's Medication Changes          These changes are accurate as of 11/19/18 11:59 PM.  If you have any questions, ask your nurse or doctor.               Start taking these medicines.        Dose/Directions    atovaquone-proguanil 250-100 MG tablet   Commonly known as:  MALARONE   Used for:  Travel advice encounter   Started by:  Katarina Lizarraga APRN CNP        Dose:  1 tablet   Take 1 tablet by mouth daily Start 2 days before exposure to Malaria and continue daily till  7 days after exposure.   Quantity:  30 tablet   Refills:  0       azithromycin 500 MG tablet   Commonly known as:  ZITHROMAX   Used for:   Travel advice encounter   Started by:  Katarina Lizarraga APRN CNP        Dose:  500 mg   Take 1 tablet (500 mg) by mouth daily for 3 doses Take 1 tablet a day for up to 3 days for severe diarrhea   Quantity:  3 tablet   Refills:  0            Where to get your medicines      These medications were sent to [id number] - [place], [place] - [address]  [address], [facility] [address]     Phone:  [phone]     atovaquone-proguanil 250-100 MG tablet    azithromycin 500 MG tablet                Primary Care Provider Fax #    Physician No Ref-Primary [phone]       No address on file        Equal Access to Services     JENNIFER ROBLES : Darlene Davila, waaxda oli, qaybliliana kaalmada facundo, sharan keys . [facility] [phone].    ATENCIÓN: Si habla español, tiene a alfred disposición servicios gratuitos de asistencia lingüística. [facility] [phone].    We comply with applicable federal civil rights laws and [place] laws. We do not discriminate on the basis of race, color, national origin, age, disability, sex, sexual orientation, or gender identity.            Thank you!     Thank you for choosing [facility]  for your care. Our goal is always to provide you with excellent care. Hearing back from our patients is one way we can continue to improve our services. Please take a few minutes to complete the written survey that you may receive in the mail after your visit with us. Thank you!             Your Updated Medication List - Protect others around you: Learn how to safely use, store and throw away your medicines at www.disposemymeds.org.          This list is accurate as of 11/19/18 11:59 PM.  Always use your most recent med list.                   Brand Name Dispense Instructions for use Diagnosis    ALLEGRA PO      Take by mouth as needed for allergies        atovaquone-proguanil 250-100 MG tablet    MALARONE     30 tablet    Take 1 tablet by mouth daily Start 2 days before exposure to Malaria and continue daily till  7 days after exposure.    Travel advice encounter       azithromycin 500 MG tablet    ZITHROMAX    3 tablet    Take 1 tablet (500 mg) by mouth daily for 3 doses Take 1 tablet a day for up to 3 days for severe diarrhea    Travel advice encounter       TYLENOL PO      Take by mouth as needed for mild pain or fever

## 2018-11-19 NOTE — PATIENT INSTRUCTIONS
Today November 19, 2018 you received the    Rabies Vaccine - Please return on 11/26/18 for your 2nd dose and 12/17/2018 for your 3rd and final dose.    Japanese Encephalitis (JE) Vaccine - Please return on 12/17/2018 for your 2nd and final dose.    Tetanus (Tdap) Vaccine    Typhoid - injectable. This vaccine is valid for two years.   .    These appointments can be made as a NURSE ONLY visit.    **It is very important for the vaccinations to be given on the scheduled day(s), this helps ensure you receive the full effectiveness of the vaccine.**    Please call [facility] with any questions [phone]    Thank you for visiting [facility]

## 2018-11-19 NOTE — NURSING NOTE
"Chief Complaint   Patient presents with     [facility]     initial /74 (BP Location: Left arm)  Temp 97.9  F (36.6  C) (Oral) Estimated body mass index is 19.51 kg/(m^2) as calculated from the following:    Height as of 10/18/18: 6' 5\" (1.956 m).    Weight as of 10/18/18: 164 lb 8 oz (74.6 kg).  BP completed using cuff size: regular.  L   arm      Health Maintenance that is potentially due pending provider review:  NONE    n/a    Gilberto Skinner ma  "

## 2018-11-27 ENCOUNTER — MYC MEDICAL ADVICE (OUTPATIENT)
Dept: FAMILY MEDICINE | Facility: CLINIC | Age: 23
End: 2018-11-27

## 2018-11-27 DIAGNOSIS — Z71.84 TRAVEL ADVICE ENCOUNTER: Primary | ICD-10-CM

## 2018-11-27 NOTE — TELEPHONE ENCOUNTER
LH  Please see [employer] message  Saw you 11/19/18 for travel [place] for 5 months  Not sure how to send Imovax as a Rx, maybe just order it and fax a copy of hand signed order to pharmacy with NPI # referenced?    Please advise,  Capri Bragg, RN

## 2018-11-28 DIAGNOSIS — Z23 NEED FOR VACCINATION: ICD-10-CM

## 2018-11-28 DIAGNOSIS — Z71.84 TRAVEL ADVICE ENCOUNTER: ICD-10-CM

## 2018-11-29 DIAGNOSIS — Z23 NEED FOR VACCINATION: ICD-10-CM

## 2018-11-29 DIAGNOSIS — Z71.84 TRAVEL ADVICE ENCOUNTER: ICD-10-CM

## 2018-12-10 NOTE — TELEPHONE ENCOUNTER
Faxed new order to number below.  Confirmed with pharm that they received it and I let mom know also.  Jennifer Stallings RN

## 2018-12-10 NOTE — TELEPHONE ENCOUNTER
I left message for mother today on phone.  We (Jennifer Stallings RN)  will fax another order to the [facility] at this time.   8:47 AM  Katarina Lizarraga (Lori) CNP

## 2018-12-10 NOTE — TELEPHONE ENCOUNTER
Patient's mom, Morelia Mendosa, called this morning regarding this.    [facility] in [place] has not received the order.    Is it possible to refax to [facility] today?  Their fax # is:  [phone]    If this is not possible, Morelia will need to come in to the clinic and  the order to fax to them today!  Patient needs this tomorrow, and pharmacy has to have the order today!    Please call Morelia to let her know what she needs to do to get this completed!    Morelia can be reached at:  [phone]  OK to leave VM
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
## 2018-12-16 ASSESSMENT — ENCOUNTER SYMPTOMS
SKIN CHANGES: 0
NAIL CHANGES: 0
POOR WOUND HEALING: 1

## 2018-12-17 ENCOUNTER — OFFICE VISIT (OUTPATIENT)
Dept: NEUROSURGERY | Facility: CLINIC | Age: 23
End: 2018-12-17
Payer: COMMERCIAL

## 2018-12-17 ENCOUNTER — ANCILLARY PROCEDURE (OUTPATIENT)
Dept: GENERAL RADIOLOGY | Facility: CLINIC | Age: 23
End: 2018-12-17
Attending: PHYSICIAN ASSISTANT
Payer: COMMERCIAL

## 2018-12-17 VITALS
SYSTOLIC BLOOD PRESSURE: 118 MMHG | WEIGHT: 169 LBS | DIASTOLIC BLOOD PRESSURE: 82 MMHG | HEART RATE: 65 BPM | HEIGHT: 77 IN | BODY MASS INDEX: 19.96 KG/M2 | OXYGEN SATURATION: 100 %

## 2018-12-17 DIAGNOSIS — Z87.81 H/O CERVICAL FRACTURE: Primary | ICD-10-CM

## 2018-12-17 ASSESSMENT — MIFFLIN-ST. JEOR: SCORE: 1888.96

## 2018-12-17 ASSESSMENT — PAIN SCALES - GENERAL: PAINLEVEL: NO PAIN (0)
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
## 2018-12-17 NOTE — LETTER
Date:December 18, 2018      Patient was self referred, no letter generated. Do not send.        [facility] Health Information

## 2018-12-18 ENCOUNTER — ALLIED HEALTH/NURSE VISIT (OUTPATIENT)
Dept: NURSING | Facility: CLINIC | Age: 23
End: 2018-12-18
Payer: COMMERCIAL

## 2018-12-18 DIAGNOSIS — Z71.84 TRAVEL ADVICE ENCOUNTER: ICD-10-CM

## 2018-12-18 PROCEDURE — 90471 IMMUNIZATION ADMIN: CPT | Mod: GA

## 2018-12-18 PROCEDURE — 90675 RABIES VACCINE IM: CPT | Mod: GA

## 2018-12-18 PROCEDURE — 90472 IMMUNIZATION ADMIN EACH ADD: CPT | Mod: GA

## 2018-12-18 PROCEDURE — 90738 INACTIVATED JE VACC IM: CPT | Mod: GA

## 2018-12-18 NOTE — NURSING NOTE
"Chief Complaint   Patient presents with     Allied Health Visit     Imm/Inj     JE and Rabies      There were no vitals taken for this visit. Estimated body mass index is 20.04 kg/m  as calculated from the following:    Height as of 12/17/18: 1.956 m (6' 5\").    Weight as of 12/17/18: 76.7 kg (169 lb).  bp completed using cuff size: NA (Not Taken)       Health Maintenance addressed:  NONE    n/a    Amy Beltran MA     "

## 2018-12-18 NOTE — PROGRESS NOTES
Screening Questionnaire for Adult Immunization    Are you sick today?   No   Do you have allergies to medications, food, a vaccine component or latex?   No   Have you ever had a serious reaction after receiving a vaccination?   No   Do you have a long-term health problem with heart disease, lung disease, asthma, kidney disease, metabolic disease (e.g. diabetes), anemia, or other blood disorder?   No   Do you have cancer, leukemia, HIV/AIDS, or any other immune system problem?   No   In the past 3 months, have you taken medications that affect  your immune system, such as prednisone, other steroids, or anticancer drugs; drugs for the treatment of rheumatoid arthritis, Crohn s disease, or psoriasis; or have you had radiation treatments?   No   Have you had a seizure, or a brain or other nervous system problem?   No   During the past year, have you received a transfusion of blood or blood     products, or been given immune (gamma) globulin or antiviral drug?   No   For women: Are you pregnant or is there a chance you could become        pregnant during the next month?   No   Have you received any vaccinations in the past 4 weeks?   No     Immunization questionnaire answers were all negative.        Per orders of SYMONE Lizarraga, injection of JE and Rabies  given by Amy Beltran. Patient instructed to remain in clinic for 15 minutes afterwards, and to report any adverse reaction to me immediately.       Screening performed by Amy Beltran on 12/18/2018 at 5:10 PM.

## 2019-01-08 ENCOUNTER — ALLIED HEALTH/NURSE VISIT (OUTPATIENT)
Dept: NURSING | Facility: CLINIC | Age: 24
End: 2019-01-08
Payer: COMMERCIAL

## 2019-01-08 DIAGNOSIS — Z71.84 TRAVEL ADVICE ENCOUNTER: Primary | ICD-10-CM

## 2019-01-08 PROCEDURE — 90675 RABIES VACCINE IM: CPT | Mod: GA

## 2019-01-08 PROCEDURE — 90471 IMMUNIZATION ADMIN: CPT | Mod: GA

## 2019-01-08 PROCEDURE — 99207 ZZC NO CHARGE NURSE ONLY: CPT

## 2019-01-08 NOTE — NURSING NOTE
Screening Questionnaire for Adult Immunization    Are you sick today?   Yes   Do you have allergies to medications, food, a vaccine component or latex?   No   Have you ever had a serious reaction after receiving a vaccination?   No   Do you have a long-term health problem with heart disease, lung disease, asthma, kidney disease, metabolic disease (e.g. diabetes), anemia, or other blood disorder?   No   Do you have cancer, leukemia, HIV/AIDS, or any other immune system problem?   No   In the past 3 months, have you taken medications that affect  your immune system, such as prednisone, other steroids, or anticancer drugs; drugs for the treatment of rheumatoid arthritis, Crohn s disease, or psoriasis; or have you had radiation treatments?   No   Have you had a seizure, or a brain or other nervous system problem?   No   During the past year, have you received a transfusion of blood or blood     products, or been given immune (gamma) globulin or antiviral drug?   No   For women: Are you pregnant or is there a chance you could become        pregnant during the next month?   No   Have you received any vaccinations in the past 4 weeks?   No     Immunization questionnaire answers were all negative.        Per orders of CHARLENE NO, injection of RABIES (3RD) given by Julianna Arauz. Patient instructed to remain in clinic for 15 minutes afterwards, and to report any adverse reaction to me immediately.       Screening performed by Julianna Arauz on 1/8/2019 at 4:35 PM.

## 2019-06-13 ENCOUNTER — OFFICE VISIT (OUTPATIENT)
Dept: NEUROSURGERY | Facility: CLINIC | Age: 24
End: 2019-06-13
Payer: COMMERCIAL

## 2019-06-13 ENCOUNTER — ANCILLARY PROCEDURE (OUTPATIENT)
Dept: CT IMAGING | Facility: CLINIC | Age: 24
End: 2019-06-13
Attending: PHYSICIAN ASSISTANT
Payer: COMMERCIAL

## 2019-06-13 VITALS
HEART RATE: 51 BPM | WEIGHT: 167.6 LBS | OXYGEN SATURATION: 100 % | RESPIRATION RATE: 16 BRPM | HEIGHT: 77 IN | DIASTOLIC BLOOD PRESSURE: 79 MMHG | TEMPERATURE: 98.1 F | SYSTOLIC BLOOD PRESSURE: 122 MMHG | BODY MASS INDEX: 19.79 KG/M2

## 2019-06-13 DIAGNOSIS — Z87.81 H/O CERVICAL FRACTURE: ICD-10-CM

## 2019-06-13 DIAGNOSIS — Z98.1 S/P CERVICAL SPINAL FUSION: ICD-10-CM

## 2019-06-13 DIAGNOSIS — T14.8XXD WOUND HEALING, DELAYED: ICD-10-CM

## 2019-06-13 ASSESSMENT — PAIN SCALES - GENERAL: PAINLEVEL: NO PAIN (0)

## 2019-06-13 ASSESSMENT — MIFFLIN-ST. JEOR: SCORE: 1882.61
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
## 2019-06-13 NOTE — LETTER
Date:June 20, 2019      Patient was self referred, no letter generated. Do not send.        [facility] Information

## 2019-06-14 ENCOUNTER — TELEPHONE (OUTPATIENT)
Dept: NEUROSURGERY | Facility: CLINIC | Age: 24
End: 2019-06-14

## 2019-06-14 NOTE — TELEPHONE ENCOUNTER
Call to patient,  Left very detailed message on mobile number and home number with information below.   Also left my name and number for questions/concerns to return call.        ----- Message from PATRICIA Hamilton CNP sent at 6/13/2019  4:40 PM CDT -----  Regarding: F/u   Zoe,    Please call Kevin and let him know the followings:  1. His C spine CT looked fine. No further f/u with Neurosurgery per Dr. Tolbert.  2. Will refer him to Dr. Patel in Plastic for eval for skin graft for his posterior cervical spine wound.  3. Remind him not to do upper body work out b/c of the wound.    Thanks,  SYMONE Klein
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
## 2019-06-17 NOTE — PATIENT INSTRUCTIONS
No routine follow up or imaging will be needed after today's visit.     We have referred you Plastic surgery for skin graft evaluation for your slow healing post surgical wound.    Call [phone] for concerns or questions.

## 2019-10-02 ENCOUNTER — HEALTH MAINTENANCE LETTER (OUTPATIENT)
Age: 24
End: 2019-10-02

## 2020-01-29 NOTE — TELEPHONE ENCOUNTER
Imaging Received  [facility]   [address] [place] [id number]  [phone]     Image Type (x):   Disc:     8/7/2018 CR Cervical spine 3V      R. Reyes RN

## 2021-01-15 ENCOUNTER — HEALTH MAINTENANCE LETTER (OUTPATIENT)
Age: 26
End: 2021-01-15

## 2021-04-09 ENCOUNTER — IMMUNIZATION (OUTPATIENT)
Dept: NURSING | Facility: CLINIC | Age: 26
End: 2021-04-09
Payer: COMMERCIAL

## 2021-04-09 PROCEDURE — 0001A PR COVID VAC PFIZER DIL RECON 30 MCG/0.3 ML IM: CPT

## 2021-04-09 PROCEDURE — 91300 PR COVID VAC PFIZER DIL RECON 30 MCG/0.3 ML IM: CPT

## 2021-04-30 ENCOUNTER — IMMUNIZATION (OUTPATIENT)
Dept: NURSING | Facility: CLINIC | Age: 26
End: 2021-04-30
Payer: COMMERCIAL

## 2021-04-30 PROCEDURE — 91300 PR COVID VAC PFIZER DIL RECON 30 MCG/0.3 ML IM: CPT

## 2021-04-30 PROCEDURE — 0002A PR COVID VAC PFIZER DIL RECON 30 MCG/0.3 ML IM: CPT

## 2021-09-04 ENCOUNTER — HEALTH MAINTENANCE LETTER (OUTPATIENT)
Age: 26
End: 2021-09-04

## 2021-11-17 ENCOUNTER — IMMUNIZATION (OUTPATIENT)
Dept: NURSING | Facility: CLINIC | Age: 26
End: 2021-11-17
Payer: COMMERCIAL

## 2021-11-17 PROCEDURE — 0064A COVID-19,PF,MODERNA (18+ YRS BOOSTER .25ML): CPT

## 2021-11-17 PROCEDURE — 91306 COVID-19,PF,MODERNA (18+ YRS BOOSTER .25ML): CPT

## 2022-02-19 ENCOUNTER — HEALTH MAINTENANCE LETTER (OUTPATIENT)
Age: 27
End: 2022-02-19

## 2022-10-22 ENCOUNTER — HEALTH MAINTENANCE LETTER (OUTPATIENT)
Age: 27
End: 2022-10-22

## 2023-03-15 NOTE — ADDENDUM NOTE
Addended by: PHYLICIA CUMMINGS on: 1/8/2019 04:37 PM     Modules accepted: Orders     High Dose Vitamin A Counseling: Side effects reviewed, pt to contact office should one occur.

## 2023-04-01 ENCOUNTER — HEALTH MAINTENANCE LETTER (OUTPATIENT)
Age: 28
End: 2023-04-01

## 2024-08-11 ENCOUNTER — HEALTH MAINTENANCE LETTER (OUTPATIENT)
Age: 29
End: 2024-08-11